# Patient Record
Sex: FEMALE | Race: WHITE | NOT HISPANIC OR LATINO | Employment: UNEMPLOYED | URBAN - METROPOLITAN AREA
[De-identification: names, ages, dates, MRNs, and addresses within clinical notes are randomized per-mention and may not be internally consistent; named-entity substitution may affect disease eponyms.]

---

## 2022-11-12 ENCOUNTER — HOSPITAL ENCOUNTER (EMERGENCY)
Age: 22
Discharge: HOME | End: 2022-11-12
Attending: EMERGENCY MEDICINE | Admitting: EMERGENCY MEDICINE

## 2022-11-12 ENCOUNTER — APPOINTMENT (OUTPATIENT)
Dept: GENERAL RADIOLOGY | Age: 22
End: 2022-11-12
Attending: EMERGENCY MEDICINE

## 2022-11-12 VITALS
OXYGEN SATURATION: 100 % | HEART RATE: 80 BPM | WEIGHT: 119.05 LBS | SYSTOLIC BLOOD PRESSURE: 110 MMHG | RESPIRATION RATE: 16 BRPM | DIASTOLIC BLOOD PRESSURE: 76 MMHG | TEMPERATURE: 97.7 F

## 2022-11-12 DIAGNOSIS — R07.9 CHEST PAIN, UNSPECIFIED TYPE: Primary | ICD-10-CM

## 2022-11-12 LAB
ANION GAP SERPL CALC-SCNC: 12 MMOL/L (ref 7–19)
BASOPHILS # BLD: 0.1 K/MCL (ref 0–0.3)
BASOPHILS NFR BLD: 1 %
BUN SERPL-MCNC: 7 MG/DL (ref 6–20)
BUN/CREAT SERPL: 10 (ref 7–25)
CALCIUM SERPL-MCNC: 9.2 MG/DL (ref 8.4–10.2)
CHLORIDE SERPL-SCNC: 104 MMOL/L (ref 97–110)
CO2 SERPL-SCNC: 27 MMOL/L (ref 21–32)
CREAT SERPL-MCNC: 0.68 MG/DL (ref 0.51–0.95)
DEPRECATED RDW RBC: 38.5 FL (ref 39–50)
EOSINOPHIL # BLD: 0.7 K/MCL (ref 0–0.5)
EOSINOPHIL NFR BLD: 10 %
ERYTHROCYTE [DISTWIDTH] IN BLOOD: 11.5 % (ref 11–15)
FASTING DURATION TIME PATIENT: ABNORMAL H
GFR SERPLBLD BASED ON 1.73 SQ M-ARVRAT: >90 ML/MIN
GLUCOSE SERPL-MCNC: 104 MG/DL (ref 70–99)
HCG SERPL-ACNC: <2 MUNITS/ML
HCT VFR BLD CALC: 42.1 % (ref 36–46.5)
HGB BLD-MCNC: 14.6 G/DL (ref 12–15.5)
IMM GRANULOCYTES # BLD AUTO: 0 K/MCL (ref 0–0.2)
IMM GRANULOCYTES # BLD: 0 %
LYMPHOCYTES # BLD: 2.3 K/MCL (ref 1–4.8)
LYMPHOCYTES NFR BLD: 34 %
MCH RBC QN AUTO: 31.9 PG (ref 26–34)
MCHC RBC AUTO-ENTMCNC: 34.7 G/DL (ref 32–36.5)
MCV RBC AUTO: 91.9 FL (ref 78–100)
MONOCYTES # BLD: 0.4 K/MCL (ref 0.3–0.9)
MONOCYTES NFR BLD: 7 %
NEUTROPHILS # BLD: 3.1 K/MCL (ref 1.8–7.7)
NEUTROPHILS NFR BLD: 48 %
NRBC BLD MANUAL-RTO: 0 /100 WBC
PLATELET # BLD AUTO: 271 K/MCL (ref 140–450)
POTASSIUM SERPL-SCNC: 4 MMOL/L (ref 3.4–5.1)
RBC # BLD: 4.58 MIL/MCL (ref 4–5.2)
SODIUM SERPL-SCNC: 139 MMOL/L (ref 135–145)
TROPONIN I SERPL DL<=0.01 NG/ML-MCNC: 10 NG/L
WBC # BLD: 6.7 K/MCL (ref 4.2–11)

## 2022-11-12 PROCEDURE — 36415 COLL VENOUS BLD VENIPUNCTURE: CPT

## 2022-11-12 PROCEDURE — 80048 BASIC METABOLIC PNL TOTAL CA: CPT | Performed by: EMERGENCY MEDICINE

## 2022-11-12 PROCEDURE — 84702 CHORIONIC GONADOTROPIN TEST: CPT | Performed by: EMERGENCY MEDICINE

## 2022-11-12 PROCEDURE — 99285 EMERGENCY DEPT VISIT HI MDM: CPT

## 2022-11-12 PROCEDURE — 99284 EMERGENCY DEPT VISIT MOD MDM: CPT | Performed by: EMERGENCY MEDICINE

## 2022-11-12 PROCEDURE — 84484 ASSAY OF TROPONIN QUANT: CPT | Performed by: EMERGENCY MEDICINE

## 2022-11-12 PROCEDURE — 71045 X-RAY EXAM CHEST 1 VIEW: CPT

## 2022-11-12 PROCEDURE — 93010 ELECTROCARDIOGRAM REPORT: CPT | Performed by: INTERNAL MEDICINE

## 2022-11-12 PROCEDURE — 85025 COMPLETE CBC W/AUTO DIFF WBC: CPT | Performed by: EMERGENCY MEDICINE

## 2022-11-12 PROCEDURE — 93005 ELECTROCARDIOGRAM TRACING: CPT | Performed by: EMERGENCY MEDICINE

## 2022-11-12 ASSESSMENT — HEART SCORE
EKG: NORMAL
RISK FACTORS: NO RISK FACTORS KNOWN
HISTORY: SLIGHTLY SUSPICIOUS
TROPONIN: EQUAL OR LESS THAN NORMAL LIMIT
AGE: LESS THAN OR EQUAL TO 45
HEART SCORE: 0

## 2022-11-12 ASSESSMENT — PAIN SCALES - GENERAL: PAINLEVEL_OUTOF10: 7

## 2022-11-14 LAB
ATRIAL RATE (BPM): 87
P AXIS (DEGREES): 41
PR-INTERVAL (MSEC): 108
QRS-INTERVAL (MSEC): 72
QT-INTERVAL (MSEC): 360
QTC: 433
R AXIS (DEGREES): 86
REPORT TEXT: NORMAL
T AXIS (DEGREES): 49
VENTRICULAR RATE EKG/MIN (BPM): 87

## 2024-03-22 DIAGNOSIS — N63.10 UNSPECIFIED LUMP IN THE RIGHT BREAST, UNSPECIFIED QUADRANT: Primary | ICD-10-CM

## 2024-06-20 ENCOUNTER — HOSPITAL ENCOUNTER (OUTPATIENT)
Dept: MAMMOGRAPHY | Age: 24
Discharge: HOME | End: 2024-06-20

## 2024-06-20 DIAGNOSIS — N63.10 UNSPECIFIED LUMP IN THE RIGHT BREAST, UNSPECIFIED QUADRANT: ICD-10-CM

## 2024-06-20 PROCEDURE — 76642 ULTRASOUND BREAST LIMITED: CPT

## 2025-01-27 ENCOUNTER — APPOINTMENT (OUTPATIENT)
Dept: URBAN - METROPOLITAN AREA CLINIC 314 | Age: 25
Setting detail: DERMATOLOGY
End: 2025-02-03

## 2025-01-27 DIAGNOSIS — L92.0 GRANULOMA ANNULARE: ICD-10-CM

## 2025-01-27 PROCEDURE — 99203 OFFICE O/P NEW LOW 30 MIN: CPT

## 2025-01-27 PROCEDURE — OTHER COUNSELING: OTHER

## 2025-01-27 PROCEDURE — OTHER ADDITIONAL NOTES: OTHER

## 2025-01-27 PROCEDURE — OTHER PRESCRIPTION: OTHER

## 2025-01-27 RX ORDER — CLOBETASOL PROPIONATE 0.5 MG/G
OINTMENT TOPICAL BID
Qty: 30 | Refills: 1 | Status: ERX | COMMUNITY
Start: 2025-01-27

## 2025-01-27 ASSESSMENT — LOCATION ZONE DERM: LOCATION ZONE: FEET

## 2025-01-27 ASSESSMENT — LOCATION SIMPLE DESCRIPTION DERM: LOCATION SIMPLE: LEFT FOOT

## 2025-01-27 ASSESSMENT — LOCATION DETAILED DESCRIPTION DERM: LOCATION DETAILED: LEFT DORSAL FOOT

## 2025-01-27 NOTE — PROCEDURE: ADDITIONAL NOTES
Additional Notes: Pt reports no changes to health /overall feeling well\\n\\nDiscussed biopsy if wanting definitive diagnosis vs topical steroids\\nWill try topical steroid first\\n\\nPt reports not bothersome\\nDiscussed PIH remaining after treatment, discussed slow to fade pigment
Render Risk Assessment In Note?: no
Detail Level: Simple

## 2025-02-07 ENCOUNTER — HOSPITAL ENCOUNTER (OUTPATIENT)
Dept: MAMMOGRAPHY | Age: 25
Discharge: HOME | End: 2025-02-07

## 2025-02-07 DIAGNOSIS — N63.10 UNSPECIFIED LUMP IN THE RIGHT BREAST, UNSPECIFIED QUADRANT: ICD-10-CM

## 2025-02-07 PROCEDURE — 76642 ULTRASOUND BREAST LIMITED: CPT

## 2025-03-17 ENCOUNTER — APPOINTMENT (OUTPATIENT)
Dept: CARDIOLOGY | Age: 25
End: 2025-03-17

## 2025-03-17 ENCOUNTER — APPOINTMENT (OUTPATIENT)
Dept: URBAN - METROPOLITAN AREA CLINIC 314 | Age: 25
Setting detail: DERMATOLOGY
End: 2025-03-23

## 2025-03-17 VITALS
OXYGEN SATURATION: 98 % | DIASTOLIC BLOOD PRESSURE: 69 MMHG | HEART RATE: 91 BPM | WEIGHT: 120.37 LBS | HEIGHT: 60 IN | SYSTOLIC BLOOD PRESSURE: 112 MMHG | BODY MASS INDEX: 23.63 KG/M2

## 2025-03-17 DIAGNOSIS — R07.2 PRECORDIAL PAIN: Primary | ICD-10-CM

## 2025-03-17 DIAGNOSIS — L92.0 GRANULOMA ANNULARE: ICD-10-CM

## 2025-03-17 DIAGNOSIS — R06.00 DYSPNEA, UNSPECIFIED TYPE: ICD-10-CM

## 2025-03-17 DIAGNOSIS — J45.909 UNCOMPLICATED ASTHMA, UNSPECIFIED ASTHMA SEVERITY, UNSPECIFIED WHETHER PERSISTENT (CMD): ICD-10-CM

## 2025-03-17 DIAGNOSIS — R00.2 PALPITATIONS: ICD-10-CM

## 2025-03-17 DIAGNOSIS — R94.31 ABNORMAL ELECTROCARDIOGRAM (ECG) (EKG): ICD-10-CM

## 2025-03-17 DIAGNOSIS — I49.8 SINUS ARRHYTHMIA: ICD-10-CM

## 2025-03-17 LAB
ATRIAL RATE (BPM): 83
P AXIS (DEGREES): 28
PR-INTERVAL (MSEC): 116
QRS-INTERVAL (MSEC): 72
QT-INTERVAL (MSEC): 356
QTC: 418
R AXIS (DEGREES): 68
REPORT TEXT: NORMAL
T AXIS (DEGREES): 37
VENTRICULAR RATE EKG/MIN (BPM): 83

## 2025-03-17 PROCEDURE — 99204 OFFICE O/P NEW MOD 45 MIN: CPT | Performed by: INTERNAL MEDICINE

## 2025-03-17 PROCEDURE — OTHER ADDITIONAL NOTES: OTHER

## 2025-03-17 PROCEDURE — OTHER PRESCRIPTION MEDICATION MANAGEMENT: OTHER

## 2025-03-17 PROCEDURE — OTHER COUNSELING: OTHER

## 2025-03-17 PROCEDURE — 99213 OFFICE O/P EST LOW 20 MIN: CPT

## 2025-03-17 ASSESSMENT — ENCOUNTER SYMPTOMS
GASTROINTESTINAL NEGATIVE: 1
EYES NEGATIVE: 1
ORTHOPNEA: 0
SYNCOPE: 0
NEUROLOGICAL NEGATIVE: 1
HEMATOLOGIC/LYMPHATIC NEGATIVE: 1
NEAR-SYNCOPE: 0
RESPIRATORY NEGATIVE: 1
CONSTITUTIONAL NEGATIVE: 1
ENDOCRINE NEGATIVE: 1
PSYCHIATRIC NEGATIVE: 1

## 2025-03-17 ASSESSMENT — PATIENT HEALTH QUESTIONNAIRE - PHQ9
1. LITTLE INTEREST OR PLEASURE IN DOING THINGS: NOT AT ALL
CLINICAL INTERPRETATION OF PHQ2 SCORE: NO FURTHER SCREENING NEEDED
SUM OF ALL RESPONSES TO PHQ9 QUESTIONS 1 AND 2: 0
SUM OF ALL RESPONSES TO PHQ9 QUESTIONS 1 AND 2: 0
2. FEELING DOWN, DEPRESSED OR HOPELESS: NOT AT ALL

## 2025-03-17 ASSESSMENT — LOCATION SIMPLE DESCRIPTION DERM: LOCATION SIMPLE: LEFT FOOT

## 2025-03-17 ASSESSMENT — LOCATION DETAILED DESCRIPTION DERM: LOCATION DETAILED: LEFT DORSAL FOOT

## 2025-03-17 ASSESSMENT — LOCATION ZONE DERM: LOCATION ZONE: FEET

## 2025-03-17 NOTE — PROCEDURE: ADDITIONAL NOTES
Render Risk Assessment In Note?: no
Detail Level: Simple
Additional Notes: Resolving today-PIH only\\nDiscussed discontinuing clobetasol\\nDiscussed post inflammatory hyperpigmentation \\nRecommended to use the clobetasol PRN for flares until clear, use reviewed

## 2025-03-31 ENCOUNTER — APPOINTMENT (OUTPATIENT)
Dept: CARDIOLOGY | Age: 25
End: 2025-03-31
Attending: INTERNAL MEDICINE

## 2025-04-14 ENCOUNTER — APPOINTMENT (OUTPATIENT)
Dept: CARDIOLOGY | Age: 25
End: 2025-04-14
Attending: INTERNAL MEDICINE

## 2025-04-14 DIAGNOSIS — R06.00 DYSPNEA, UNSPECIFIED TYPE: ICD-10-CM

## 2025-04-14 LAB
AORTIC VALVE AREA (AVA): 0.75
AORTIC VALVE AREA: 2.05
ASCENDING AORTA (AAD): 3
AV MEAN GRADIENT (AVMG): 3
AV MEAN VELOCITY (AVMV): 0.77
AV PEAK GRADIENT (AVPG): 5
AV PEAK VELOCITY (AVPV): 1.13
AV STENOSIS SEVERITY TEXT: NORMAL
AVI LVOT PEAK GRADIENT (LVOTMG): 0.6
E WAVE DECELARATION TIME (MDT): 5.51
INTERVENTRICULAR SEPTUM IN END DIASTOLE (IVSD): 2.2
LEFT INTERNAL DIMENSION IN SYSTOLE (LVSD): 0.6
LEFT VENTRICULAR INTERNAL DIMENSION IN DIASTOLE (LVDD): 2.6
LEFT VENTRICULAR POSTERIOR WALL IN END DIASTOLE (LVPW): 3.8
LV EF: NORMAL %
LVOT 2D (LVOTD): 13.8
LVOT VTI (LVOTVTI): 0.8
MV E TISSUE VEL LAT (MELV): 1
MV E TISSUE VEL MED (MESV): 20.4
MV E WAVE VEL/E TISSUE VEL MED(MSR): 13.6
MV PEAK A VELOCITY (MVPAV): 187
MV PEAK E VELOCITY (MVPEV): 0.61
RV END SYSTOLIC LONGITUDINAL STRAIN FREE WALL (RVGS): 2
TRICUSPID VALVE PEAK REGURGITATION VELOCITY (TRPV): 2.16

## 2025-04-14 PROCEDURE — 93356 MYOCRD STRAIN IMG SPCKL TRCK: CPT | Performed by: INTERNAL MEDICINE

## 2025-04-14 PROCEDURE — 76376 3D RENDER W/INTRP POSTPROCES: CPT | Performed by: INTERNAL MEDICINE

## 2025-04-14 PROCEDURE — 93306 TTE W/DOPPLER COMPLETE: CPT | Performed by: INTERNAL MEDICINE

## 2025-05-01 ENCOUNTER — APPOINTMENT (OUTPATIENT)
Dept: CARDIOLOGY | Age: 25
End: 2025-05-01
Attending: INTERNAL MEDICINE

## 2025-05-01 DIAGNOSIS — R07.2 PRECORDIAL PAIN: ICD-10-CM

## 2025-05-01 LAB
HEART RATE RESERVE PREDICTED: -1.03 BPM
RESTING HR ACHIEVED: 81 BPM
STRESS BASELINE BP: NORMAL MMHG
STRESS PEAK HR: 196 BPM
STRESS PERCENT HR: 101 %
STRESS POST PEAK BP: NORMAL MMHG
STRESS TARGET HR: 194 BPM

## 2025-05-01 PROCEDURE — 93015 CV STRESS TEST SUPVJ I&R: CPT | Performed by: INTERNAL MEDICINE

## 2025-05-05 ENCOUNTER — IMAGING SERVICES (OUTPATIENT)
Dept: GENERAL RADIOLOGY | Age: 25
End: 2025-05-05
Attending: INTERNAL MEDICINE

## 2025-05-05 ENCOUNTER — APPOINTMENT (OUTPATIENT)
Dept: CARDIOLOGY | Age: 25
End: 2025-05-05

## 2025-05-05 VITALS
SYSTOLIC BLOOD PRESSURE: 112 MMHG | OXYGEN SATURATION: 98 % | HEART RATE: 89 BPM | BODY MASS INDEX: 23.52 KG/M2 | HEIGHT: 60 IN | DIASTOLIC BLOOD PRESSURE: 71 MMHG | WEIGHT: 119.82 LBS

## 2025-05-05 DIAGNOSIS — R06.00 DYSPNEA, UNSPECIFIED TYPE: ICD-10-CM

## 2025-05-05 DIAGNOSIS — I49.8 SINUS ARRHYTHMIA: ICD-10-CM

## 2025-05-05 DIAGNOSIS — R94.31 ABNORMAL ELECTROCARDIOGRAM (ECG) (EKG): ICD-10-CM

## 2025-05-05 DIAGNOSIS — R07.2 PRECORDIAL PAIN: ICD-10-CM

## 2025-05-05 DIAGNOSIS — R00.2 PALPITATIONS: ICD-10-CM

## 2025-05-05 DIAGNOSIS — R07.2 PRECORDIAL PAIN: Primary | ICD-10-CM

## 2025-05-05 PROCEDURE — 71046 X-RAY EXAM CHEST 2 VIEWS: CPT | Performed by: INTERNAL MEDICINE

## 2025-05-05 ASSESSMENT — PATIENT HEALTH QUESTIONNAIRE - PHQ9
CLINICAL INTERPRETATION OF PHQ2 SCORE: NO FURTHER SCREENING NEEDED
1. LITTLE INTEREST OR PLEASURE IN DOING THINGS: NOT AT ALL
SUM OF ALL RESPONSES TO PHQ9 QUESTIONS 1 AND 2: 0
2. FEELING DOWN, DEPRESSED OR HOPELESS: NOT AT ALL
SUM OF ALL RESPONSES TO PHQ9 QUESTIONS 1 AND 2: 0

## 2025-05-05 ASSESSMENT — ENCOUNTER SYMPTOMS
GASTROINTESTINAL NEGATIVE: 1
RESPIRATORY NEGATIVE: 1
ENDOCRINE NEGATIVE: 1
EYES NEGATIVE: 1
NEUROLOGICAL NEGATIVE: 1
ALLERGIC/IMMUNOLOGIC NEGATIVE: 1
HEMATOLOGIC/LYMPHATIC NEGATIVE: 1
PSYCHIATRIC NEGATIVE: 1
CONSTITUTIONAL NEGATIVE: 1

## 2025-07-07 DIAGNOSIS — N63.10 UNSPECIFIED LUMP IN THE RIGHT BREAST, UNSPECIFIED QUADRANT: Primary | ICD-10-CM

## 2025-09-03 ENCOUNTER — HOSPITAL ENCOUNTER (OUTPATIENT)
Dept: MAMMOGRAPHY | Age: 25
Discharge: HOME | End: 2025-09-03

## 2025-09-03 DIAGNOSIS — N63.10 UNSPECIFIED LUMP IN THE RIGHT BREAST, UNSPECIFIED QUADRANT: ICD-10-CM

## 2025-09-03 PROCEDURE — 76642 ULTRASOUND BREAST LIMITED: CPT
